# Patient Record
(demographics unavailable — no encounter records)

---

## 2017-01-15 DIAGNOSIS — E03.9 HYPOTHYROIDISM: Primary | ICD-10-CM

## 2017-01-16 RX ORDER — LEVOTHYROXINE SODIUM 88 UG/1
TABLET ORAL
Qty: 30 TABLET | Refills: 0 | Status: SHIPPED | OUTPATIENT
Start: 2017-01-16

## 2017-01-16 NOTE — TELEPHONE ENCOUNTER
Ok x one refill- due for office visit and labs-  please call to schedule    Yeni García RN  [facility]  [phone]

## 2017-01-16 NOTE — TELEPHONE ENCOUNTER
Levothyroxine     Last Written Prescription Date: 1/15/16  Last Quantity: 30, # refills: 11  Last Office Visit with G, P or [facility] prescribing provider: 1/15/16        TSH   Date Value Ref Range Status   01/15/2016 2.95 0.40 - 4.00 mU/L Siri Augustine CMA

## 2017-01-18 NOTE — TELEPHONE ENCOUNTER
Patient called back and was already seen at [facility]  Patient states he's not coming to [place] at this moment  TC informed patient to let the pharmacy know to send his request to [facility] Provider  Angelic RAGLAND

## 2017-01-18 NOTE — TELEPHONE ENCOUNTER
left voicemail message for patient to contact TC line to schedule.  NTBS:due for office visit and fasting labs  Angelic RAGLAND